# Patient Record
Sex: FEMALE | Race: WHITE | NOT HISPANIC OR LATINO | Employment: PART TIME | URBAN - METROPOLITAN AREA
[De-identification: names, ages, dates, MRNs, and addresses within clinical notes are randomized per-mention and may not be internally consistent; named-entity substitution may affect disease eponyms.]

---

## 2017-08-21 ENCOUNTER — INITIAL PRENATAL (OUTPATIENT)
Dept: OBSTETRICS AND GYNECOLOGY | Facility: CLINIC | Age: 32
End: 2017-08-21

## 2017-08-21 ENCOUNTER — PROCEDURE VISIT (OUTPATIENT)
Dept: OBSTETRICS AND GYNECOLOGY | Facility: CLINIC | Age: 32
End: 2017-08-21

## 2017-08-21 VITALS — DIASTOLIC BLOOD PRESSURE: 78 MMHG | WEIGHT: 228 LBS | SYSTOLIC BLOOD PRESSURE: 120 MMHG | BODY MASS INDEX: 36.8 KG/M2

## 2017-08-21 DIAGNOSIS — O36.80X0 ENCOUNTER TO DETERMINE FETAL VIABILITY OF PREGNANCY, NOT APPLICABLE OR UNSPECIFIED FETUS: Primary | ICD-10-CM

## 2017-08-21 DIAGNOSIS — Z34.81 PRENATAL CARE, SUBSEQUENT PREGNANCY, FIRST TRIMESTER: Primary | ICD-10-CM

## 2017-08-21 DIAGNOSIS — O34.219 HISTORY OF CESAREAN DELIVERY, ANTEPARTUM: ICD-10-CM

## 2017-08-21 DIAGNOSIS — K21.00 REFLUX ESOPHAGITIS: ICD-10-CM

## 2017-08-21 LAB
GLUCOSE UR STRIP-MCNC: NEGATIVE MG/DL
KETONES UR QL: NEGATIVE
PROT UR STRIP-MCNC: NEGATIVE MG/DL

## 2017-08-21 PROCEDURE — 99214 OFFICE O/P EST MOD 30 MIN: CPT | Performed by: OBSTETRICS & GYNECOLOGY

## 2017-08-21 PROCEDURE — 76801 OB US < 14 WKS SINGLE FETUS: CPT | Performed by: OBSTETRICS & GYNECOLOGY

## 2017-08-21 RX ORDER — VITAMIN A ACETATE, BETA CAROTENE, ASCORBIC ACID, CHOLECALCIFEROL, .ALPHA.-TOCOPHEROL ACETATE, DL-, THIAMINE MONONITRATE, RIBOFLAVIN, NIACINAMIDE, PYRIDOXINE HYDROCHLORIDE, FOLIC ACID, CYANOCOBALAMIN, CALCIUM CARBONATE, FERROUS FUMARATE, ZINC OXIDE, CUPRIC OXIDE 3080; 12; 120; 400; 1; 1.84; 3; 20; 22; 920; 25; 200; 27; 10; 2 [IU]/1; UG/1; MG/1; [IU]/1; MG/1; MG/1; MG/1; MG/1; MG/1; [IU]/1; MG/1; MG/1; MG/1; MG/1; MG/1
1 TABLET, FILM COATED ORAL DAILY
Qty: 30 TABLET | Refills: 11 | Status: SHIPPED | OUTPATIENT
Start: 2017-08-21

## 2017-08-21 RX ORDER — FAMOTIDINE 20 MG/1
20 TABLET, FILM COATED ORAL DAILY
Qty: 30 TABLET | Refills: 5 | Status: SHIPPED | OUTPATIENT
Start: 2017-08-21 | End: 2018-01-26

## 2017-08-21 NOTE — PROGRESS NOTES
WILFRED   31-year-old white female  5 para 4 presents for initial prenatal visit.  She has a history of 4 previous  sections.  She denies vaginal bleeding.  She does complain of some reflux which has worsened since she became pregnant.  She takes Pepcid for her reflux.  She has not started prenatal vitamins.  Her previous C-sections have been uncomplicated.  She has had 4 boys.  She desires genetic testing.  S=U/S  LMP approximate.  A: 12 week intrauterine pregnancy  Previous  section ×4  Reflux esophagitis  Plan: Recommend repeat  section with possible sterilization  Desires genetic testing.  Discussed pregnancy in general including diet and exercise as well as medication usage.  Return to the office in 4 weeks.

## 2017-08-24 LAB
C TRACH RRNA CVX QL NAA+PROBE: NEGATIVE
CONV .: NORMAL
CYTOLOGIST CVX/VAG CYTO: NORMAL
CYTOLOGY CVX/VAG DOC THIN PREP: NORMAL
DX ICD CODE: NORMAL
DX ICD CODE: NORMAL
HIV 1 & 2 AB SER-IMP: NORMAL
N GONORRHOEA RRNA CVX QL NAA+PROBE: NEGATIVE
OTHER STN SPEC: NORMAL
PATH REPORT.FINAL DX SPEC: NORMAL
STAT OF ADQ CVX/VAG CYTO-IMP: NORMAL

## 2017-08-27 LAB
# FETUSES US: 1
ABO GROUP BLD: (no result)
BACTERIA UR CULT: NORMAL
BACTERIA UR CULT: NORMAL
BASOPHILS # BLD AUTO: 0 X10E3/UL (ref 0–0.2)
BASOPHILS NFR BLD AUTO: 0 %
BLD GP AB SCN SERPL QL: NEGATIVE
CFDNA.FET/CFDNA.TOTAL SFR FETUS: 8.9 %
CHR X + Y ANEUP PLAS.CFDNA: NORMAL
CITATION REF LAB TEST: NORMAL
CYTOGENETICS STUDY: NORMAL
EOSINOPHIL # BLD AUTO: 0.4 X10E3/UL (ref 0–0.4)
EOSINOPHIL NFR BLD AUTO: 4 %
ERYTHROCYTE [DISTWIDTH] IN BLOOD BY AUTOMATED COUNT: 14.8 % (ref 12.3–15.4)
FET 13+18+21+X+Y ANEUP PLAS.CFDNA: NORMAL
FET CHR 13 TS PLAS.CFDNA QL: NORMAL
FET CHR 13 TS PLAS.CFDNA QL: NORMAL
FET CHR 18 TS PLAS.CFDNA QL: NORMAL
FET CHR 18 TS PLAS.CFDNA QL: NORMAL
FET CHR 21 TS PLAS.CFDNA QL: NORMAL
FET CHR 21 TS PLAS.CFDNA QL: NORMAL
FET CHROM X + Y ANEUP CFDNA IMP: NORMAL
GA: 12.4 WEEKS
GENETIC ALGORITHM SENSITIVITY: NORMAL %
HBV SURFACE AG SERPL QL IA: NEGATIVE
HCT VFR BLD AUTO: 33.9 % (ref 34–46.6)
HCV AB S/CO SERPL IA: <0.1 S/CO RATIO (ref 0–0.9)
HGB BLD-MCNC: 11.2 G/DL (ref 11.1–15.9)
HIV 1+2 AB+HIV1 P24 AG SERPL QL IA: NON REACTIVE
IMM GRANULOCYTES # BLD: 0 X10E3/UL (ref 0–0.1)
IMM GRANULOCYTES NFR BLD: 0 %
LAB DIRECTOR NAME PROVIDER: NORMAL
LYMPHOCYTES # BLD AUTO: 3.2 X10E3/UL (ref 0.7–3.1)
LYMPHOCYTES NFR BLD AUTO: 34 %
Lab: NORMAL
MCH RBC QN AUTO: 26 PG (ref 26.6–33)
MCHC RBC AUTO-ENTMCNC: 33 G/DL (ref 31.5–35.7)
MCV RBC AUTO: 79 FL (ref 79–97)
MONOCYTES # BLD AUTO: 0.7 X10E3/UL (ref 0.1–0.9)
MONOCYTES NFR BLD AUTO: 8 %
NEUTROPHILS # BLD AUTO: 5.1 X10E3/UL (ref 1.4–7)
NEUTROPHILS NFR BLD AUTO: 54 %
PLATELET # BLD AUTO: 282 X10E3/UL (ref 150–379)
RBC # BLD AUTO: 4.31 X10E6/UL (ref 3.77–5.28)
REASON FOR REFERRAL (NARRATIVE): NORMAL
REF LAB TEST METHOD: NORMAL
RH BLD: POSITIVE
RPR SER QL: NON REACTIVE
RUBV IGG SERPL IA-ACNC: 1.92 INDEX
SERVICE CMNT 02-IMP: NORMAL
SERVICE CMNT-IMP: NORMAL
WBC # BLD AUTO: 9.4 X10E3/UL (ref 3.4–10.8)

## 2017-09-25 ENCOUNTER — ROUTINE PRENATAL (OUTPATIENT)
Dept: OBSTETRICS AND GYNECOLOGY | Facility: CLINIC | Age: 32
End: 2017-09-25

## 2017-09-25 VITALS — SYSTOLIC BLOOD PRESSURE: 120 MMHG | WEIGHT: 226 LBS | DIASTOLIC BLOOD PRESSURE: 78 MMHG | BODY MASS INDEX: 36.48 KG/M2

## 2017-09-25 DIAGNOSIS — A59.9 TRICHIMONIASIS: ICD-10-CM

## 2017-09-25 DIAGNOSIS — O34.219 HISTORY OF CESAREAN DELIVERY, ANTEPARTUM: ICD-10-CM

## 2017-09-25 DIAGNOSIS — Z34.82 PRENATAL CARE, SUBSEQUENT PREGNANCY, SECOND TRIMESTER: Primary | ICD-10-CM

## 2017-09-25 LAB
GLUCOSE UR STRIP-MCNC: NEGATIVE MG/DL
KETONES UR QL: NEGATIVE
PROT UR STRIP-MCNC: NEGATIVE MG/DL

## 2017-09-25 PROCEDURE — 99213 OFFICE O/P EST LOW 20 MIN: CPT | Performed by: OBSTETRICS & GYNECOLOGY

## 2017-09-25 RX ORDER — METRONIDAZOLE 500 MG/1
500 TABLET ORAL 2 TIMES DAILY
Qty: 14 TABLET | Refills: 0 | Status: SHIPPED | OUTPATIENT
Start: 2017-09-25 | End: 2017-10-02

## 2017-09-25 NOTE — PROGRESS NOTES
CC: 17w2d IUP  Fetus active  VS reviewed  C/O vag d/c-trich found on pap  46XX  A: 17wk IUP  Trich vag  Prev C/S x4  P: Rx Flagyl  Katelyn screen 4 wks

## 2017-10-12 ENCOUNTER — TELEPHONE (OUTPATIENT)
Dept: OBSTETRICS AND GYNECOLOGY | Facility: CLINIC | Age: 32
End: 2017-10-12

## 2017-10-13 RX ORDER — HYDROCODONE BITARTRATE AND ACETAMINOPHEN 5; 325 MG/1; MG/1
1 TABLET ORAL EVERY 6 HOURS PRN
Qty: 30 TABLET | Refills: 0
Start: 2017-10-13 | End: 2017-11-06 | Stop reason: ALTCHOICE

## 2017-10-13 NOTE — TELEPHONE ENCOUNTER
Spoke with patient last evening regarding pros and cons of narcotic use during pregnancy.  Limited, periodic use has not been associated with fetal narcotic withdrawal syndrome.  A prescription for Lortab 5/325 #30 was written.

## 2017-10-23 ENCOUNTER — ROUTINE PRENATAL (OUTPATIENT)
Dept: OBSTETRICS AND GYNECOLOGY | Facility: CLINIC | Age: 32
End: 2017-10-23

## 2017-10-23 ENCOUNTER — PROCEDURE VISIT (OUTPATIENT)
Dept: OBSTETRICS AND GYNECOLOGY | Facility: CLINIC | Age: 32
End: 2017-10-23

## 2017-10-23 VITALS — WEIGHT: 231 LBS | BODY MASS INDEX: 37.28 KG/M2 | SYSTOLIC BLOOD PRESSURE: 118 MMHG | DIASTOLIC BLOOD PRESSURE: 70 MMHG

## 2017-10-23 DIAGNOSIS — Z34.82 PRENATAL CARE, SUBSEQUENT PREGNANCY, SECOND TRIMESTER: Primary | ICD-10-CM

## 2017-10-23 DIAGNOSIS — Z36.3 SCREENING, ANTENATAL, FOR MALFORMATION BY ULTRASOUND: Primary | ICD-10-CM

## 2017-10-23 LAB
BILIRUB BLD-MCNC: ABNORMAL MG/DL
GLUCOSE UR STRIP-MCNC: NEGATIVE MG/DL
KETONES UR QL: ABNORMAL
LEUKOCYTE EST, POC: ABNORMAL
NITRITE UR-MCNC: NEGATIVE MG/ML
PH UR: 5.5 [PH] (ref 5–8)
PROT UR STRIP-MCNC: NEGATIVE MG/DL
RBC # UR STRIP: ABNORMAL /UL
SP GR UR: 1.02 (ref 1–1.03)
UROBILINOGEN UR QL: NORMAL

## 2017-10-23 PROCEDURE — 99212 OFFICE O/P EST SF 10 MIN: CPT | Performed by: NURSE PRACTITIONER

## 2017-10-23 PROCEDURE — 76805 OB US >/= 14 WKS SNGL FETUS: CPT | Performed by: NURSE PRACTITIONER

## 2017-10-23 NOTE — PROGRESS NOTES
HPI: 31 y.o.  at 21w2d presents for routine OB care today. Anatomy screen today. Treated for trich 3-4 weeks ago. States symptoms have resolved. No complaints today, Active fetus.     Vitals:    10/23/17 1132   BP: 118/70   Weight: 231 lb (105 kg)         ROS:  GI:  Negative  : no ctx, active fetus  Pulmonary: Negative     A/P  1. Intrauterine pregnancy at 21w2d   2. Pregnancy Risk:  HIGH RISK    Diagnoses and all orders for this visit:    Prenatal care, subsequent pregnancy, second trimester  -     POC Urinalysis Dipstick        PLAN:   No Follow-up on file.    RTO 2 weeks for test of cure trich  PTL warnings    Allie Bah, APRN  10/23/2017 11:46 AM

## 2017-11-06 ENCOUNTER — ROUTINE PRENATAL (OUTPATIENT)
Dept: OBSTETRICS AND GYNECOLOGY | Facility: CLINIC | Age: 32
End: 2017-11-06

## 2017-11-06 VITALS — DIASTOLIC BLOOD PRESSURE: 70 MMHG | BODY MASS INDEX: 36.96 KG/M2 | SYSTOLIC BLOOD PRESSURE: 100 MMHG | WEIGHT: 229 LBS

## 2017-11-06 DIAGNOSIS — G57.51 TARSAL TUNNEL SYNDROME OF RIGHT SIDE: ICD-10-CM

## 2017-11-06 DIAGNOSIS — Z34.82 PRENATAL CARE, SUBSEQUENT PREGNANCY, SECOND TRIMESTER: Primary | ICD-10-CM

## 2017-11-06 DIAGNOSIS — O34.219 HISTORY OF CESAREAN DELIVERY, ANTEPARTUM: ICD-10-CM

## 2017-11-06 PROBLEM — A59.9 TRICHIMONIASIS: Status: RESOLVED | Noted: 2017-09-25 | Resolved: 2017-11-06

## 2017-11-06 LAB
GLUCOSE UR STRIP-MCNC: NEGATIVE MG/DL
KETONES UR QL: NEGATIVE
PROT UR STRIP-MCNC: NEGATIVE MG/DL

## 2017-11-06 PROCEDURE — 99213 OFFICE O/P EST LOW 20 MIN: CPT | Performed by: OBSTETRICS & GYNECOLOGY

## 2017-11-06 RX ORDER — HYDROCODONE BITARTRATE AND ACETAMINOPHEN 7.5; 325 MG/1; MG/1
1 TABLET ORAL 2 TIMES DAILY
Qty: 56 TABLET | Refills: 0
Start: 2017-11-06 | End: 2017-12-13 | Stop reason: SDUPTHER

## 2017-11-06 NOTE — PROGRESS NOTES
CC: 23w2d IUP  Her foot pain persists and is interfering with her ability to work.  The podiatrist states nothing can be done until she has delivered.  She has been diagnosed with tarsal tunnel syndrome.  She has a support boot.  The fetus is active  Vital signs reviewed  Comparative studies of her anatomy screen with her initial presenting ultrasound showed a 6 day lag of growth.  She has had essentially no weight gain.  She is a eating well.  A: 23 wk IUP SGA?  Tarsal tunnel syndrome  P: U/S and GTS next month  Rx Lortab 7.5 bid #56

## 2017-12-04 ENCOUNTER — ROUTINE PRENATAL (OUTPATIENT)
Dept: OBSTETRICS AND GYNECOLOGY | Facility: CLINIC | Age: 32
End: 2017-12-04

## 2017-12-04 ENCOUNTER — PROCEDURE VISIT (OUTPATIENT)
Dept: OBSTETRICS AND GYNECOLOGY | Facility: CLINIC | Age: 32
End: 2017-12-04

## 2017-12-04 VITALS — BODY MASS INDEX: 37.93 KG/M2 | DIASTOLIC BLOOD PRESSURE: 70 MMHG | SYSTOLIC BLOOD PRESSURE: 100 MMHG | WEIGHT: 235 LBS

## 2017-12-04 DIAGNOSIS — O34.219 HISTORY OF CESAREAN DELIVERY, ANTEPARTUM: ICD-10-CM

## 2017-12-04 DIAGNOSIS — Z34.82 PRENATAL CARE, SUBSEQUENT PREGNANCY, SECOND TRIMESTER: Primary | ICD-10-CM

## 2017-12-04 DIAGNOSIS — Z36.89 ENCOUNTER FOR ULTRASOUND TO CHECK FETAL GROWTH: Primary | ICD-10-CM

## 2017-12-04 LAB
GLUCOSE UR STRIP-MCNC: NEGATIVE MG/DL
KETONES UR QL: ABNORMAL
PROT UR STRIP-MCNC: NEGATIVE MG/DL

## 2017-12-04 PROCEDURE — 99212 OFFICE O/P EST SF 10 MIN: CPT | Performed by: OBSTETRICS & GYNECOLOGY

## 2017-12-04 PROCEDURE — 76816 OB US FOLLOW-UP PER FETUS: CPT | Performed by: OBSTETRICS & GYNECOLOGY

## 2017-12-04 NOTE — PROGRESS NOTES
CC: 27w2d IUP  Fetus active  VS reviewed  EFW 2-2 17.8% NESTOR nl-still 6d lag  A: adequate growth  P: GTS today  Cont growth scans

## 2017-12-05 LAB
BASOPHILS # BLD AUTO: 0.03 10*3/MM3 (ref 0–0.2)
BASOPHILS NFR BLD AUTO: 0.2 % (ref 0–1.5)
BLD GP AB SCN SERPL QL: NEGATIVE
EOSINOPHIL # BLD AUTO: 0.56 10*3/MM3 (ref 0–0.7)
EOSINOPHIL NFR BLD AUTO: 4.3 % (ref 0.3–6.2)
ERYTHROCYTE [DISTWIDTH] IN BLOOD BY AUTOMATED COUNT: 13.4 % (ref 11.7–13)
GLUCOSE 1H P 50 G GLC PO SERPL-MCNC: 142 MG/DL (ref 65–139)
HCT VFR BLD AUTO: 32.7 % (ref 35.6–45.5)
HGB BLD-MCNC: 10.6 G/DL (ref 11.9–15.5)
IMM GRANULOCYTES # BLD: 0.05 10*3/MM3 (ref 0–0.03)
IMM GRANULOCYTES NFR BLD: 0.4 % (ref 0–0.5)
LYMPHOCYTES # BLD AUTO: 3.25 10*3/MM3 (ref 0.9–4.8)
LYMPHOCYTES NFR BLD AUTO: 25.1 % (ref 19.6–45.3)
MCH RBC QN AUTO: 27.8 PG (ref 26.9–32)
MCHC RBC AUTO-ENTMCNC: 32.4 G/DL (ref 32.4–36.3)
MCV RBC AUTO: 85.8 FL (ref 80.5–98.2)
MONOCYTES # BLD AUTO: 0.69 10*3/MM3 (ref 0.2–1.2)
MONOCYTES NFR BLD AUTO: 5.3 % (ref 5–12)
NEUTROPHILS # BLD AUTO: 8.36 10*3/MM3 (ref 1.9–8.1)
NEUTROPHILS NFR BLD AUTO: 64.7 % (ref 42.7–76)
PLATELET # BLD AUTO: 286 10*3/MM3 (ref 140–500)
RBC # BLD AUTO: 3.81 10*6/MM3 (ref 3.9–5.2)
WBC # BLD AUTO: 12.94 10*3/MM3 (ref 4.5–10.7)

## 2017-12-06 DIAGNOSIS — Z13.1 SCREENING FOR DIABETES MELLITUS: Primary | ICD-10-CM

## 2017-12-13 ENCOUNTER — TELEPHONE (OUTPATIENT)
Dept: OBSTETRICS AND GYNECOLOGY | Facility: CLINIC | Age: 32
End: 2017-12-13

## 2017-12-13 DIAGNOSIS — G57.51 TARSAL TUNNEL SYNDROME OF RIGHT SIDE: ICD-10-CM

## 2017-12-13 LAB
GLUCOSE 1H P 100 G GLC PO SERPL-MCNC: 134 MG/DL
GLUCOSE 2H P 100 G GLC PO SERPL-MCNC: 128 MG/DL
GLUCOSE 3H P 100 G GLC PO SERPL-MCNC: 50 MG/DL
GLUCOSE P FAST SERPL-MCNC: 74 MG/DL

## 2017-12-13 RX ORDER — HYDROCODONE BITARTRATE AND ACETAMINOPHEN 7.5; 325 MG/1; MG/1
1 TABLET ORAL 2 TIMES DAILY
Qty: 20 TABLET | Refills: 0
Start: 2017-12-13 | End: 2018-04-11

## 2018-01-03 ENCOUNTER — ROUTINE PRENATAL (OUTPATIENT)
Dept: OBSTETRICS AND GYNECOLOGY | Facility: CLINIC | Age: 33
End: 2018-01-03

## 2018-01-03 ENCOUNTER — PROCEDURE VISIT (OUTPATIENT)
Dept: OBSTETRICS AND GYNECOLOGY | Facility: CLINIC | Age: 33
End: 2018-01-03

## 2018-01-03 VITALS — DIASTOLIC BLOOD PRESSURE: 72 MMHG | BODY MASS INDEX: 38.25 KG/M2 | SYSTOLIC BLOOD PRESSURE: 120 MMHG | WEIGHT: 237 LBS

## 2018-01-03 DIAGNOSIS — Z34.83 PRENATAL CARE, SUBSEQUENT PREGNANCY, THIRD TRIMESTER: Primary | ICD-10-CM

## 2018-01-03 DIAGNOSIS — O34.219 HISTORY OF CESAREAN DELIVERY, ANTEPARTUM: ICD-10-CM

## 2018-01-03 DIAGNOSIS — O36.5920 POOR FETAL GROWTH AFFECTING MANAGEMENT OF MOTHER IN SECOND TRIMESTER, SINGLE OR UNSPECIFIED FETUS: Primary | ICD-10-CM

## 2018-01-03 DIAGNOSIS — Z30.2 REQUEST FOR STERILIZATION: ICD-10-CM

## 2018-01-03 LAB
GLUCOSE UR STRIP-MCNC: NEGATIVE MG/DL
KETONES UR QL: NEGATIVE
PROT UR STRIP-MCNC: ABNORMAL MG/DL

## 2018-01-03 PROCEDURE — 99213 OFFICE O/P EST LOW 20 MIN: CPT | Performed by: OBSTETRICS & GYNECOLOGY

## 2018-01-03 PROCEDURE — 76816 OB US FOLLOW-UP PER FETUS: CPT | Performed by: OBSTETRICS & GYNECOLOGY

## 2018-01-03 NOTE — PROGRESS NOTES
31w4d IUP  Fetus active  Denies ctx  EFW 3-9 15% NESTOR nl  A: SGA persists  Adequate interval growth  Desires BTL with RC/S  P: Follow growth  sched RC/S+BTL  next visit

## 2018-01-17 ENCOUNTER — ROUTINE PRENATAL (OUTPATIENT)
Dept: OBSTETRICS AND GYNECOLOGY | Facility: CLINIC | Age: 33
End: 2018-01-17

## 2018-01-17 ENCOUNTER — PROCEDURE VISIT (OUTPATIENT)
Dept: OBSTETRICS AND GYNECOLOGY | Facility: CLINIC | Age: 33
End: 2018-01-17

## 2018-01-17 VITALS — SYSTOLIC BLOOD PRESSURE: 100 MMHG | BODY MASS INDEX: 38.58 KG/M2 | WEIGHT: 239 LBS | DIASTOLIC BLOOD PRESSURE: 70 MMHG

## 2018-01-17 DIAGNOSIS — Z36.89 ENCOUNTER FOR ULTRASOUND TO CHECK FETAL GROWTH: Primary | ICD-10-CM

## 2018-01-17 DIAGNOSIS — O34.219 HISTORY OF CESAREAN DELIVERY, ANTEPARTUM: ICD-10-CM

## 2018-01-17 DIAGNOSIS — G57.51 TARSAL TUNNEL SYNDROME OF RIGHT SIDE: ICD-10-CM

## 2018-01-17 DIAGNOSIS — Z34.83 PRENATAL CARE, SUBSEQUENT PREGNANCY, THIRD TRIMESTER: Primary | ICD-10-CM

## 2018-01-17 DIAGNOSIS — Z30.2 REQUEST FOR STERILIZATION: ICD-10-CM

## 2018-01-17 LAB
GLUCOSE UR STRIP-MCNC: NEGATIVE MG/DL
KETONES UR QL: NEGATIVE
PROT UR STRIP-MCNC: ABNORMAL MG/DL

## 2018-01-17 PROCEDURE — 76816 OB US FOLLOW-UP PER FETUS: CPT | Performed by: OBSTETRICS & GYNECOLOGY

## 2018-01-17 PROCEDURE — 99213 OFFICE O/P EST LOW 20 MIN: CPT | Performed by: OBSTETRICS & GYNECOLOGY

## 2018-01-17 NOTE — PROGRESS NOTES
CC: 33w4d IUP  Fetus active  Denies ctx  EFW 4-10 26% NESTOR 17  A: adequate growth still SGA by exam  Prev C/S  Sterilization  P: follow growth  Kick cts  sched RC/S +BTL 2-23 @ Sub

## 2018-01-26 ENCOUNTER — ROUTINE PRENATAL (OUTPATIENT)
Dept: OBSTETRICS AND GYNECOLOGY | Facility: CLINIC | Age: 33
End: 2018-01-26

## 2018-01-26 VITALS — WEIGHT: 241 LBS | SYSTOLIC BLOOD PRESSURE: 100 MMHG | DIASTOLIC BLOOD PRESSURE: 74 MMHG | BODY MASS INDEX: 38.9 KG/M2

## 2018-01-26 DIAGNOSIS — O34.219 HISTORY OF CESAREAN DELIVERY, ANTEPARTUM: ICD-10-CM

## 2018-01-26 DIAGNOSIS — Z34.83 PRENATAL CARE, SUBSEQUENT PREGNANCY, THIRD TRIMESTER: Primary | ICD-10-CM

## 2018-01-26 DIAGNOSIS — K21.00 REFLUX ESOPHAGITIS: ICD-10-CM

## 2018-01-26 LAB
BILIRUB BLD-MCNC: ABNORMAL MG/DL
GLUCOSE UR STRIP-MCNC: NEGATIVE MG/DL
KETONES UR QL: ABNORMAL
LEUKOCYTE EST, POC: ABNORMAL
NITRITE UR-MCNC: NEGATIVE MG/ML
PH UR: 6 [PH] (ref 5–8)
PROT UR STRIP-MCNC: ABNORMAL MG/DL
RBC # UR STRIP: NEGATIVE /UL
SP GR UR: 1.02 (ref 1–1.03)
UROBILINOGEN UR QL: NORMAL

## 2018-01-26 PROCEDURE — 99213 OFFICE O/P EST LOW 20 MIN: CPT | Performed by: OBSTETRICS & GYNECOLOGY

## 2018-01-26 RX ORDER — FAMOTIDINE 20 MG/1
20 TABLET, FILM COATED ORAL 2 TIMES DAILY PRN
Qty: 30 TABLET | Refills: 5 | Status: SHIPPED | OUTPATIENT
Start: 2018-01-26 | End: 2018-01-26 | Stop reason: SDUPTHER

## 2018-01-26 RX ORDER — FAMOTIDINE 20 MG/1
20 TABLET, FILM COATED ORAL 2 TIMES DAILY PRN
Qty: 60 TABLET | Refills: 1 | Status: SHIPPED | OUTPATIENT
Start: 2018-01-26 | End: 2019-01-26

## 2018-01-26 NOTE — PROGRESS NOTES
CC: 34w6d  C/o reflux  Fetus active  Pelvic pressure  A: reflux  Hx borderline SGA  P: increase pepcid to bid, Tums  Recheck growth 2 weeks

## 2018-02-12 ENCOUNTER — PROCEDURE VISIT (OUTPATIENT)
Dept: OBSTETRICS AND GYNECOLOGY | Facility: CLINIC | Age: 33
End: 2018-02-12

## 2018-02-12 ENCOUNTER — ROUTINE PRENATAL (OUTPATIENT)
Dept: OBSTETRICS AND GYNECOLOGY | Facility: CLINIC | Age: 33
End: 2018-02-12

## 2018-02-12 VITALS — BODY MASS INDEX: 39.38 KG/M2 | WEIGHT: 244 LBS | SYSTOLIC BLOOD PRESSURE: 118 MMHG | DIASTOLIC BLOOD PRESSURE: 84 MMHG

## 2018-02-12 DIAGNOSIS — O34.219 HISTORY OF CESAREAN DELIVERY, ANTEPARTUM: ICD-10-CM

## 2018-02-12 DIAGNOSIS — O99.213 OBESITY COMPLICATING PREGNANCY IN THIRD TRIMESTER: ICD-10-CM

## 2018-02-12 DIAGNOSIS — Z34.83 PRENATAL CARE, SUBSEQUENT PREGNANCY, THIRD TRIMESTER: Primary | ICD-10-CM

## 2018-02-12 LAB
BILIRUB BLD-MCNC: NEGATIVE MG/DL
GLUCOSE UR STRIP-MCNC: NEGATIVE MG/DL
KETONES UR QL: NEGATIVE
LEUKOCYTE EST, POC: NEGATIVE
NITRITE UR-MCNC: NEGATIVE MG/ML
PH UR: 6 [PH] (ref 5–8)
PROT UR STRIP-MCNC: NEGATIVE MG/DL
RBC # UR STRIP: NEGATIVE /UL
SP GR UR: 1.02 (ref 1–1.03)
UROBILINOGEN UR QL: NORMAL

## 2018-02-12 PROCEDURE — 99213 OFFICE O/P EST LOW 20 MIN: CPT | Performed by: OBSTETRICS & GYNECOLOGY

## 2018-02-12 PROCEDURE — 76819 FETAL BIOPHYS PROFIL W/O NST: CPT | Performed by: OBSTETRICS & GYNECOLOGY

## 2018-02-12 NOTE — PROGRESS NOTES
CC: 37w2d IUP  Fetus active  No ctx  5-9 8.9% NESTOR 18 BPP 8/8  GBS done  A: SGA  P: BPP next week  Kick cts  C/S sched next week

## 2018-02-14 LAB — GP B STREP DNA SPEC QL NAA+PROBE: NEGATIVE

## 2018-02-19 ENCOUNTER — PROCEDURE VISIT (OUTPATIENT)
Dept: OBSTETRICS AND GYNECOLOGY | Facility: CLINIC | Age: 33
End: 2018-02-19

## 2018-02-19 ENCOUNTER — ROUTINE PRENATAL (OUTPATIENT)
Dept: OBSTETRICS AND GYNECOLOGY | Facility: CLINIC | Age: 33
End: 2018-02-19

## 2018-02-19 VITALS — DIASTOLIC BLOOD PRESSURE: 84 MMHG | WEIGHT: 246 LBS | BODY MASS INDEX: 39.71 KG/M2 | SYSTOLIC BLOOD PRESSURE: 120 MMHG

## 2018-02-19 DIAGNOSIS — Z34.83 PRENATAL CARE, SUBSEQUENT PREGNANCY, THIRD TRIMESTER: Primary | ICD-10-CM

## 2018-02-19 DIAGNOSIS — Z30.2 REQUEST FOR STERILIZATION: ICD-10-CM

## 2018-02-19 DIAGNOSIS — O34.219 HISTORY OF CESAREAN DELIVERY, ANTEPARTUM: ICD-10-CM

## 2018-02-19 LAB
BILIRUB BLD-MCNC: NEGATIVE MG/DL
GLUCOSE UR STRIP-MCNC: NEGATIVE MG/DL
KETONES UR QL: NEGATIVE
LEUKOCYTE EST, POC: ABNORMAL
NITRITE UR-MCNC: NEGATIVE MG/ML
PH UR: 6.5 [PH] (ref 5–8)
PROT UR STRIP-MCNC: NEGATIVE MG/DL
RBC # UR STRIP: NEGATIVE /UL
SP GR UR: 1.02 (ref 1–1.03)
UROBILINOGEN UR QL: NORMAL

## 2018-02-19 PROCEDURE — 99213 OFFICE O/P EST LOW 20 MIN: CPT | Performed by: OBSTETRICS & GYNECOLOGY

## 2018-02-19 PROCEDURE — 76819 FETAL BIOPHYS PROFIL W/O NST: CPT | Performed by: OBSTETRICS & GYNECOLOGY

## 2018-02-19 NOTE — PROGRESS NOTES
CC: 38w2d IUP  C/o ctx-sporadic, painful  No bleeding  Fetus active  EFW 6-1 10% NESTOR 11 BPP 8/8  A: SGA  P: disc moving C/S up to Wednesday  Pt to consider

## 2018-02-23 ENCOUNTER — OUTSIDE FACILITY SERVICE (OUTPATIENT)
Dept: OBSTETRICS AND GYNECOLOGY | Facility: CLINIC | Age: 33
End: 2018-02-23

## 2018-02-23 PROCEDURE — 58611 LIGATE OVIDUCT(S) ADD-ON: CPT | Performed by: OBSTETRICS & GYNECOLOGY

## 2018-02-23 PROCEDURE — 59515 CESAREAN DELIVERY: CPT | Performed by: OBSTETRICS & GYNECOLOGY

## 2018-02-26 ENCOUNTER — OUTSIDE FACILITY SERVICE (OUTPATIENT)
Dept: OBSTETRICS AND GYNECOLOGY | Facility: CLINIC | Age: 33
End: 2018-02-26

## 2018-02-26 PROCEDURE — 99024 POSTOP FOLLOW-UP VISIT: CPT | Performed by: OBSTETRICS & GYNECOLOGY

## 2018-02-27 ENCOUNTER — OUTSIDE FACILITY SERVICE (OUTPATIENT)
Dept: OBSTETRICS AND GYNECOLOGY | Facility: CLINIC | Age: 33
End: 2018-02-27

## 2018-03-02 ENCOUNTER — POSTPARTUM VISIT (OUTPATIENT)
Dept: OBSTETRICS AND GYNECOLOGY | Facility: CLINIC | Age: 33
End: 2018-03-02

## 2018-03-02 VITALS
BODY MASS INDEX: 38.09 KG/M2 | WEIGHT: 237 LBS | SYSTOLIC BLOOD PRESSURE: 130 MMHG | DIASTOLIC BLOOD PRESSURE: 96 MMHG | HEIGHT: 66 IN

## 2018-03-02 DIAGNOSIS — Z98.890 POSTOPERATIVE STATE: ICD-10-CM

## 2018-03-02 PROBLEM — O34.219 HISTORY OF CESAREAN DELIVERY, ANTEPARTUM: Status: RESOLVED | Noted: 2017-08-21 | Resolved: 2018-03-02

## 2018-03-02 PROBLEM — O99.213 OBESITY COMPLICATING PREGNANCY IN THIRD TRIMESTER: Status: RESOLVED | Noted: 2018-02-12 | Resolved: 2018-03-02

## 2018-03-02 PROBLEM — Z30.2 REQUEST FOR STERILIZATION: Status: RESOLVED | Noted: 2018-01-03 | Resolved: 2018-03-02

## 2018-03-02 PROCEDURE — 0503F POSTPARTUM CARE VISIT: CPT | Performed by: OBSTETRICS & GYNECOLOGY

## 2018-03-02 RX ORDER — OXYCODONE HYDROCHLORIDE AND ACETAMINOPHEN 5; 325 MG/1; MG/1
TABLET ORAL
COMMUNITY
Start: 2018-02-26 | End: 2018-03-02 | Stop reason: SDUPTHER

## 2018-03-02 RX ORDER — IBUPROFEN 800 MG/1
TABLET ORAL
COMMUNITY
Start: 2018-02-26 | End: 2018-04-11

## 2018-03-02 RX ORDER — OXYCODONE HYDROCHLORIDE AND ACETAMINOPHEN 5; 325 MG/1; MG/1
1-2 TABLET ORAL EVERY 4 HOURS PRN
Qty: 30 TABLET | Refills: 0
Start: 2018-03-02 | End: 2018-04-11

## 2018-03-02 RX ORDER — DOCUSATE SODIUM 100 MG/1
CAPSULE ORAL
COMMUNITY
Start: 2018-02-26 | End: 2018-04-11

## 2018-03-02 NOTE — PROGRESS NOTES
Subjective   Kiran Borrero is a 32 y.o. female presents for staple removal     History of Present Illness   32-year-old white female status post her fifth  section with tubal ligation presents for staple removal.  She has regained normal bowel bladder function.  She is breast-feeding and this is going well.  She she complains of lower abdominal and low back pain.    The following portions of the patient's history were reviewed and updated as appropriate: allergies, current medications, past family history, past medical history, past social history, past surgical history and problem list.    Review of Systems   Constitutional: Negative for chills and fever.   Gastrointestinal: Positive for constipation. Negative for nausea.   Genitourinary: Negative for difficulty urinating, pelvic pain and vaginal discharge.       Objective   Physical Exam   She is alert and conversant and in no acute distress.  Blood pressure was mildly elevated at 136/96.  The abdomen is soft and flat and nontender.  Bowel sounds are present and active.  Pfannenstiel incision was well-healed and the staples were removed without difficulty.  Extremities are without swelling or tenderness.  DTRs are 1+ bilaterally.    Assessment/Plan   Kiran was seen today for postpartum follow-up.    Diagnoses and all orders for this visit:    Postpartum state    Postoperative state  -     oxyCODONE-acetaminophen (PERCOCET) 5-325 MG per tablet; Take 1-2 tablets by mouth Every 4 (Four) Hours As Needed for Moderate Pain  or Severe Pain .     Instructed the patient to increase her fluids and activities.  She can take a stool softener.  She will return for routine check in 2 weeks.

## 2018-04-11 ENCOUNTER — POSTPARTUM VISIT (OUTPATIENT)
Dept: OBSTETRICS AND GYNECOLOGY | Facility: CLINIC | Age: 33
End: 2018-04-11

## 2018-04-11 VITALS
BODY MASS INDEX: 37.77 KG/M2 | HEIGHT: 66 IN | SYSTOLIC BLOOD PRESSURE: 100 MMHG | DIASTOLIC BLOOD PRESSURE: 70 MMHG | WEIGHT: 235 LBS

## 2018-04-11 DIAGNOSIS — Z98.890 POSTOPERATIVE STATE: Primary | ICD-10-CM

## 2018-04-11 PROCEDURE — 0503F POSTPARTUM CARE VISIT: CPT | Performed by: OBSTETRICS & GYNECOLOGY

## 2018-04-11 NOTE — PROGRESS NOTES
Subjective   Kiran Borrero is a 32 y.o. female here for postpartum follow-up     History of Present IllnessThe following portions of the patient's history were reviewed and updated as appropriate: allergies, current medications, past family history, past medical history, past social history, past surgical history and problem list.   32-year-old white female status post repeat  section and tubal ligation presents for 6 week follow-up.  She has regained normal bowel and bladder function and is doing well.  She is still breast-feeding.  She is still having issues with her right foot and has a follow-up scheduled.     Review of Systems   Cardiovascular: Negative for leg swelling.   Gastrointestinal: Negative for abdominal distention and abdominal pain.   Genitourinary: Negative for difficulty urinating, pelvic pain, vaginal bleeding and vaginal discharge.       Objective   Physical Exam   The patient is alert and conversant and in no acute distress.  Breasts are without mass or tenderness.  The abdomen is soft, obese and nontender.  No masses were palpable.  Pfannenstiel incision is well-healed.  The vulva and perineum are without lesion.  The vagina is without discharge or bleeding.  The cervix is closed and nontender to motion.  Uterus is anteverted and well contracted.  The adnexa are without mass or tenderness.  The rectovaginal septum is intact.  Extremities are without mass or tenderness.     Assessment/Plan   Kiran was seen today for postpartum follow-up.    Diagnoses and all orders for this visit:    Postoperative state    Postpartum state       she will resume normal activities.  She will continue prenatal vitamins as long as she is dictating.  Annual exams were recommended.

## 2020-07-27 ENCOUNTER — NURSE TRIAGE (OUTPATIENT)
Dept: CALL CENTER | Facility: HOSPITAL | Age: 35
End: 2020-07-27

## 2020-07-28 NOTE — TELEPHONE ENCOUNTER
Reason for Disposition  • [1] COVID-19 infection suspected by caller or triager AND [2] mild symptoms (cough, fever, or others) AND [3] no complications or SOB    Additional Information  • Negative: SEVERE difficulty breathing (e.g., struggling for each breath, speaks in single words)  • Negative: Difficult to awaken or acting confused (e.g., disoriented, slurred speech)  • Negative: Bluish (or gray) lips or face now  • Negative: Shock suspected (e.g., cold/pale/clammy skin, too weak to stand, low BP, rapid pulse)  • Negative: Sounds like a life-threatening emergency to the triager  • Negative: [1] COVID-19 exposure AND [2] no symptoms  • Negative: COVID-19 and Breastfeeding, questions about  • Negative: [1] Adult with possible COVID-19 symptoms AND [2] triager concerned about severity of symptoms or other causes  • Negative: SEVERE or constant chest pain or pressure (Exception: mild central chest pain, present only when coughing)  • Negative: MODERATE difficulty breathing (e.g., speaks in phrases, SOB even at rest, pulse 100-120)  • Negative: Patient sounds very sick or weak to the triager  • Negative: MILD difficulty breathing (e.g., minimal/no SOB at rest, SOB with walking, pulse <100)  • Negative: Chest pain or pressure  • Negative: Fever > 103 F (39.4 C)  • Negative: [1] Fever > 101 F (38.3 C) AND [2] age > 60  • Negative: [1] Fever > 100.0 F (37.8 C) AND [2] bedridden (e.g., nursing home patient, CVA, chronic illness, recovering from surgery)  • Negative: HIGH RISK patient (e.g., age > 64 years, diabetes, heart or lung disease, weak immune system)  • Negative: Fever present > 3 days (72 hours)  • Negative: [1] Fever returns after gone for over 24 hours AND [2] symptoms worse or not improved  • Negative: [1] Continuous (nonstop) coughing interferes with work or school AND [2] no improvement using cough treatment per protocol  • Negative: Cough present > 3 weeks  • Negative: [1] COVID-19 diagnosed by  "positive lab test AND [2] mild symptoms (e.g., cough, fever, others) AND [3] no complications or SOB    Answer Assessment - Initial Assessment Questions  1. COVID-19 DIAGNOSIS: \"Who made your Coronavirus (COVID-19) diagnosis?\" \"Was it confirmed by a positive lab test?\" If not diagnosed by a HCP, ask \"Are there lots of cases (community spread) where you live?\" (See public health department website, if unsure)      No test yet  2. ONSET: \"When did the COVID-19 symptoms start?\"       5 days   3. WORST SYMPTOM: \"What is your worst symptom?\" (e.g., cough, fever, shortness of breath, muscle aches)      Cough   4. COUGH: \"Do you have a cough?\" If so, ask: \"How bad is the cough?\"        Yes; moderate   5. FEVER: \"Do you have a fever?\" If so, ask: \"What is your temperature, how was it measured, and when did it start?\"      Yes; unknown   6. RESPIRATORY STATUS: \"Describe your breathing?\" (e.g., shortness of breath, wheezing, unable to speak)       Shortness of breath at times   7. BETTER-SAME-WORSE: \"Are you getting better, staying the same or getting worse compared to yesterday?\"  If getting worse, ask, \"In what way?\"      Worse   8. HIGH RISK DISEASE: \"Do you have any chronic medical problems?\" (e.g., asthma, heart or lung disease, weak immune system, etc.)      No   9. PREGNANCY: \"Is there any chance you are pregnant?\" \"When was your last menstrual period?\"      No   10. OTHER SYMPTOMS: \"Do you have any other symptoms?\"  (e.g., chills, fatigue, headache, loss of smell or taste, muscle pain, sore throat)        Chills    Protocols used: CORONAVIRUS (COVID-19) DIAGNOSED OR SUSPECTED-ADULT-AH      "